# Patient Record
Sex: FEMALE | Race: OTHER | NOT HISPANIC OR LATINO | URBAN - METROPOLITAN AREA
[De-identification: names, ages, dates, MRNs, and addresses within clinical notes are randomized per-mention and may not be internally consistent; named-entity substitution may affect disease eponyms.]

---
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
## 2019-09-16 ENCOUNTER — TRANSCRIPTION ENCOUNTER (OUTPATIENT)
Age: 19
End: 2019-09-16

## 2021-03-29 NOTE — ED PROVIDER NOTE - PROGRESS NOTE ADDITIONAL2
AFTER YOUR PROCEDURE    Date of Procedure: 3/29/2021    You had the following procedure(s) performed today:  Colonoscopy    Exam Results:  The Physician removed 1 polyp(s), which will be sent to the lab for testing.  and Lab results will be called or mailed to you within 7-10 business days. Please follow up as directed in your final lab report     Diet Instructions:  You may resume your regular diet today. It is recommended to avoid heavy, greasy, and spicy foods today.    Medications:  You may resume your medications as prescribed.    Activity for Today:    · DO NOT DRIVE.  Do not operate any other heavy machinery or equipment.  · Avoid activities that require coordination (climbing ladders, using a knife/cooking equipment, etc.)  · Do not make important financial or legal decisions  · Do not return to work.  · Do not drink any alcohol.  · You may resume your activity tomorrow.    It is normal to have.....    Colonoscopy / Sigmoidoscopy   · Mild abdominal distention and/or cramping are normal after these procedures, but should pass within an hour or two with the passage of air.  · Mild nausea and/or vomiting       When to call Dr. Vaughn at [phone]:    For Colonoscopy / Sigmoidoscopy   · If you have unusual belly pain that is NOT relieved with passing air  · If you have rectal bleeding more than blood streaking on the toilet tissue  · If you have moderate nausea and/or vomiting         Go to the Emergency Room if you experience any of the following:  · Severe pain  · Difficulty breathing or swallowing  · Persistent vomiting  · Vomiting blood, either brown (coffee ground in consistency) or red  · Significant rectal bleeding  · Chills or fever over 101 degrees F.     Additional Instructions:  Any further questions after hours please contact [facility] 24 hours nurse call center at [phone].   Additional Progress Note...

## 2021-09-05 ENCOUNTER — EMERGENCY (EMERGENCY)
Facility: HOSPITAL | Age: 21
LOS: 1 days | Discharge: HOME | End: 2021-09-05
Attending: EMERGENCY MEDICINE | Admitting: EMERGENCY MEDICINE
Payer: MEDICAID

## 2021-09-05 VITALS
DIASTOLIC BLOOD PRESSURE: 70 MMHG | RESPIRATION RATE: 15 BRPM | TEMPERATURE: 98 F | SYSTOLIC BLOOD PRESSURE: 104 MMHG | HEART RATE: 83 BPM | OXYGEN SATURATION: 99 %

## 2021-09-05 VITALS
HEIGHT: 61 IN | SYSTOLIC BLOOD PRESSURE: 108 MMHG | WEIGHT: 115.08 LBS | OXYGEN SATURATION: 99 % | HEART RATE: 89 BPM | DIASTOLIC BLOOD PRESSURE: 68 MMHG | TEMPERATURE: 99 F | RESPIRATION RATE: 15 BRPM

## 2021-09-05 PROCEDURE — 29515 APPLICATION SHORT LEG SPLINT: CPT

## 2021-09-05 PROCEDURE — 29515 APPLICATION SHORT LEG SPLINT: CPT | Mod: LT

## 2021-09-05 PROCEDURE — 73630 X-RAY EXAM OF FOOT: CPT

## 2021-09-05 PROCEDURE — 99283 EMERGENCY DEPT VISIT LOW MDM: CPT | Mod: 25

## 2021-09-05 PROCEDURE — 73630 X-RAY EXAM OF FOOT: CPT | Mod: 26,LT

## 2021-09-05 RX ORDER — ACETAMINOPHEN 500 MG
650 TABLET ORAL ONCE
Refills: 0 | Status: COMPLETED | OUTPATIENT
Start: 2021-09-05 | End: 2021-09-05

## 2021-09-05 RX ORDER — NORETHINDRONE AND ETHINYL ESTRADIOL 0.4-0.035
1 KIT ORAL
Qty: 0 | Refills: 0 | DISCHARGE

## 2021-09-05 RX ADMIN — Medication 650 MILLIGRAM(S): at 17:10

## 2021-09-05 RX ADMIN — Medication 650 MILLIGRAM(S): at 17:50

## 2021-09-05 NOTE — ED ADULT NURSE NOTE - OBJECTIVE STATEMENT
Pt c/o pin and bruising to left foot s/p twisting ankle yesterday. Denies any other complaints. + bruising and tenderness noted to left lateral foot. CR < 2 sec.

## 2021-09-05 NOTE — ED PROVIDER NOTE - CARE PROVIDER_API CALL
Ulisses Muller (DPM)  [place] [address]  Phone: [phone]  Fax: [phone]  Follow Up Time: 1-3 Days

## 2021-09-05 NOTE — ED PROVIDER NOTE - PHYSICAL EXAMINATION
Constitutional: Awake, Alert, non-toxic. NAD. Well appearing, well nourished.   HEAD: Normocephalic, atraumatic.   EYES: EOM intact, conjunctiva and sclera are clear bilaterally.   ENT: No rhinorrhea, patent, mucous membranes pink/moist, no drooling or stridor.   NECK: Supple, non-tender  RESPIRATORY: Normal respiratory effort  EXTREMITIES: Full passive and active ROM in all extremities; (+) left lateral foot ecchymosis, swelling, and TTP, ankle and proximal tib/fib non-tender to palpation; distal pulses palpable and symmetric  SKIN: Warm, dry; good skin turgor, no apparent lesions or rashes, no ecchymosis, brisk capillary refill.  NEURO: A&O x3. Sensory and motor functions are grossly intact. Speech is normal. Appearance and judgement seem appropriate for gender and age.

## 2021-09-05 NOTE — ED ADULT NURSE NOTE - AS SC BRADEN SENSORY
Has had injections in the past but doing well currently       Of note improved after starting trintellix       (4) no impairment

## 2021-09-05 NOTE — ED PROVIDER NOTE - PROGRESS NOTE DETAILS
Sondra DECKER for attending Dr. Abbott: 23 y/o M with no significant PMHx presents to ED c/o L foot pain & ecchymosis s/p injuring foot yesterday when going down one step into a sunken living room and twisting it. Denies weakness, numbness or any other injury.     Physical Exam: WD, WN, in NAD, LLE: hip, knee & ankle nontender & FROM, foot: tender over 5th metatarsal w/ overlying ecchymosis & superficial abrasion. Sondra DECKER for attending Dr. Abbott: 23 y/o female presents to ED c/o L foot pain & ecchymosis s/p injuring foot yesterday when going down one step into a sunken living room and twisting it. Denies weakness, numbness or any other injury.     Physical Exam: WD, WN, in NAD, LLE: hip, knee & ankle nontender & FROM, foot: tender over 5th metatarsal w/ overlying ecchymosis & superficial abrasion, distal n/v intact, cap refill < 2 secs all toes splint applied. All questions were answered. Discussed the importance of prompt, close medical follow-up. Patient will return with any changes, concerns or persistent/worsening symptoms.  Patient verbalized understanding.

## 2021-09-05 NOTE — ED PROVIDER NOTE - PATIENT PORTAL LINK FT
You can access the FollowMyHealth Patient Portal offered by [facility] by registering at the following website: [email or web address]. By joining [employer]’s FollowMyHealth portal, you will also be able to view your health information using other applications (apps) compatible with our system.

## 2021-09-05 NOTE — ED PROVIDER NOTE - CLINICAL SUMMARY MEDICAL DECISION MAKING FREE TEXT BOX
presents today due to left foot pain x 1 day. pt reports she twisted her foot yesterday on marble floor. plan includes xray foot r/o fx, and pain control

## 2021-09-05 NOTE — ED PROVIDER NOTE - NSFOLLOWUPINSTRUCTIONS_ED_ALL_ED_FT
1) Follow-up with Podiatry, See referred doctor. Call today/next business day for close, prompt follow-up.  2) Return to Emergency room for any worsening or persistent pain, weakness, numbness, fever, color change to extremity, or any other concerning symptoms.  3) Take ibuprofen 600 mg every  6 hours as needed.   4) You can consider discussing with your doctor if physical therapy or further imaging as an MRI may be beneficial.       Foot Pain    Many things can cause foot pain. Some common causes are:  •An injury.      •A sprain.      •Arthritis.      •Blisters.      •Bunions.        Follow these instructions at home:      Managing pain, stiffness, and swelling   If directed, put ice on the painful area:  •Put ice in a plastic bag.      •Place a towel between your skin and the bag.      •Leave the ice on for 20 minutes, 2–3 times a day.      Activity     • Do not stand or walk for long periods.      •Return to your normal activities as told by your health care provider. Ask your health care provider what activities are safe for you.      •Do stretches to relieve foot pain and stiffness as told by your health care provider.      • Do not lift anything that is heavier than 10 lb (4.5 kg), or the limit that you are told, until your health care provider says that it is safe. Lifting a lot of weight can put added pressure on your feet.      Lifestyle     •Wear comfortable, supportive shoes that fit you well. Do not wear high heels.      •Keep your feet clean and dry.      General instructions     •Take over-the-counter and prescription medicines only as told by your health care provider.      •Rub your foot gently.      •Pay attention to any changes in your symptoms.      •Keep all follow-up visits as told by your health care provider. This is important.        Contact a health care provider if:    •Your pain does not get better after a few days of self-care.      •Your pain gets worse.      •You cannot stand on your foot.        Get help right away if:    •Your foot is numb or tingling.      •Your foot or toes are swollen.      •Your foot or toes turn white or blue.      •You have warmth and redness along your foot.        Summary    •Common causes of foot pain are injury, sprain, arthritis, blisters or bunions.      •Ice, medicines, and comfortable shoes may help foot pain.      •Contact your health care provider if your pain does not get better after a few days of self-care.      This information is not intended to replace advice given to you by your health care provider. Make sure you discuss any questions you have with your health care provider.

## 2021-09-05 NOTE — ED PROVIDER NOTE - OBJECTIVE STATEMENT
20 y/o female with PMHx Ovarian cyst presents today due to left foot pain x 1 day. pt reports she twisted her foot yesterday on marble floor. pt describes pain as aching, non-radiating, and currently 6/10. pt reports swelling and bruising. pt reports she sustained a abrasion pt denies fever, head injury, LOC, numbness/weakness, or any other complaints.

## 2021-09-11 ENCOUNTER — EMERGENCY (EMERGENCY)
Facility: HOSPITAL | Age: 21
LOS: 1 days | Discharge: HOME | End: 2021-09-11
Attending: EMERGENCY MEDICINE | Admitting: EMERGENCY MEDICINE
Payer: MEDICAID

## 2021-09-11 VITALS
OXYGEN SATURATION: 99 % | HEART RATE: 84 BPM | WEIGHT: 119.93 LBS | DIASTOLIC BLOOD PRESSURE: 69 MMHG | HEIGHT: 61 IN | TEMPERATURE: 98 F | RESPIRATION RATE: 20 BRPM | SYSTOLIC BLOOD PRESSURE: 104 MMHG

## 2021-09-11 PROCEDURE — 99283 EMERGENCY DEPT VISIT LOW MDM: CPT

## 2021-09-11 PROCEDURE — 99282 EMERGENCY DEPT VISIT SF MDM: CPT

## 2021-09-11 NOTE — ED PROVIDER NOTE - NSFOLLOWUPINSTRUCTIONS_ED_ALL_ED_FT
Foot Sprain    WHAT YOU NEED TO KNOW:    A foot sprain is a stretched or torn ligament in the foot or toe. Ligaments are tough tissues that connect bones.    DISCHARGE INSTRUCTIONS:    Return to the emergency department if:   •You have numbness or tingling below the injury, such as in your toes.      •The skin on your injured foot is blue or pale.      •You have increased pain, even after you take pain medicine.      Call your doctor if:   •You have new weakness in your foot.      •You have new or increased swelling in your foot.      •You have new or increased stiffness when you move your injured foot.      •You have questions or concerns about your condition or care.      Medicines:   •NSAIDs, such as ibuprofen, help decrease swelling, pain, and fever. This medicine is available with or without a doctor's order. NSAIDs can cause stomach bleeding or kidney problems in certain people. If you take blood thinner medicine, always ask if NSAIDs are safe for you. Always read the medicine label and follow directions. Do not give these medicines to children under 6 months of age without direction from your child's healthcare provider.      •Take your medicine as directed. Contact your healthcare provider if you think your medicine is not helping or if you have side effects. Tell him of her if you are allergic to any medicine. Keep a list of the medicines, vitamins, and herbs you take. Include the amounts, and when and why you take them. Bring the list or the pill bottles to follow-up visits. Carry your medicine list with you in case of an emergency.      Self-care:   •Rest your foot. Limit movement in your sprained foot for the first 2 to 3 days. You might need crutches to take weight off your injured foot as it heals. Use crutches as directed.  Walking with Crutches           •Apply ice on your foot for 15 to 20 minutes every hour or as directed. Use an ice pack, or put crushed ice in a plastic bag. Cover it with a towel. Ice helps prevent tissue damage and decreases swelling and pain.      •Compress your foot. You may need to use tape or an elastic bandage to support your foot if you have a mild sprain. You may need a splint on your foot for support if your sprain is severe. Wear your splint for as many days as directed.      •Elevate your foot above the level of your heart as often as you can. This will help decrease swelling and pain. Prop your foot on pillows or blankets to keep it elevated comfortably.   Elevate Leg           Exercise your foot: You may be given exercises to improve your strength and to help decrease stiffness. The exercises and physical therapy can help restore strength and increase the range of motion in your foot. Ask your healthcare provider when you can return to your normal activities or play sports.    Prevent another foot sprain:   •Warm up and stretch before you exercise.      •Do not exercise when you feel pain or are tired.      •Wear equipment to protect yourself when you play sports.      Follow up with your doctor as directed: Write down your questions so you remember to ask them during your visits.

## 2021-09-11 NOTE — ED ADULT NURSE NOTE - OBJECTIVE STATEMENT
Seen here one week ago for sprained ankle. splint applied to left ankle. c/o increased pain, splint unraveling.  patient ambulates to void, pulse +, MD at bedside assessing patient, will continue to monitor.

## 2021-09-11 NOTE — ED PROVIDER NOTE - CARE PROVIDER_API CALL
King Puentes  ORTHOPAEDIC SURGERY  [address] [place] [address]  Phone: [phone]  Fax: [phone]  Follow Up Time:

## 2021-09-11 NOTE — ED PROVIDER NOTE - NS_ ATTENDINGSCRIBEDETAILS _ED_A_ED_FT
Arley Lynn MD - The scribe's documentation has been prepared under my direction and personally reviewed by me in its entirety. I confirm that the note above accurately reflects all work, treatment, procedures, and medical decision making performed by me.

## 2021-09-11 NOTE — ED PROVIDER NOTE - OBJECTIVE STATEMENT
22 y/o male with no PMHx presents to the ED c/o ankle pain.  Pt was here one week ago for a sprained ankle. A splint applied to left ankle. Pt reports now the splint is unraveling. 22 y/o male with no PMHx presents to the ED c/o ankle pain.  Pt was here one week ago for a sprained ankle with swelling. A splint applied to left ankle. Pt reports that the splint was unraveling. Pt described the pain is traveling up left leg and discomfort in the left foot. Pt reports walking with the splint. Denies pain in upper left foot.

## 2021-09-11 NOTE — ED PROVIDER NOTE - CLINICAL SUMMARY MEDICAL DECISION MAKING FREE TEXT BOX
Pt one week s/p foot sprain still in splint c/o discomfort and loosening splint. Will advise removal of splint local care. Can f/u with orthopedist if not completely improved.

## 2021-09-11 NOTE — ED PROVIDER NOTE - MUSCULOSKELETAL, MLM
left foot no swelling no sign bony tenderness ecchymosis lateral aspect, FROM all joints neurovascular intact all digits

## 2021-09-11 NOTE — ED ADULT TRIAGE NOTE - CHIEF COMPLAINT QUOTE
Seen here one week ago for sprained ankle. splint applied to left ankle. c/o increased pain, splint unraveling.

## 2021-09-11 NOTE — ED PROVIDER NOTE - PATIENT PORTAL LINK FT
You can access the FollowMyHealth Patient Portal offered by [facility] by registering at the following website: [email or web address]. By joining [employer]’s FollowMyHealth portal, you will also be able to view your health information using other applications (apps) compatible with our system.

## 2022-01-14 NOTE — ED ADULT NURSE NOTE - NS_NURSE_DISC_TEACHING_YN_ED_ALL_ED
-- DO NOT REPLY / DO NOT REPLY ALL --  -- Message is from the [facility]--    General Patient Message      Reason for Call:  Calling on the status of the medication refill on 13 prescriptions   Please update     Caller Information       Type Contact Phone    01/14/2022 09:32 AM CST Phone (Incoming) [employer]  [phone]          Alternative phone number:     Turnaround time given to caller:   \"This message will be sent to [state Provider's name]. The clinical team will fulfill your request as soon as they review your message.\"     Yes

## 2022-02-14 NOTE — ED PROVIDER NOTE - MUSCULOSKELETAL [+], MLM
+left leg pain
Detail Level: Detailed
Quality 130: Documentation Of Current Medications In The Medical Record: Current Medications Documented
Quality 265: Biopsy Follow-Up: Biopsy results reviewed, communicated, tracked, and documented

## 2022-06-07 NOTE — ED PROVIDER NOTE - MUSCULOSKELETAL [+], MLM
----- Message from Willard Brian sent at 6/6/2022  3:51 PM EDT -----  Regarding: Medication change request for Alicia Smith [facility] [place] where Brenda Nixon goes to get his dialysis, is requesting that Brenda Nixon not take his Amlodipine before his treatments which are from 11:30-3PM on M, W & F  What time of the day would you like him to take this medication? He can take it anytime between 3PM and bedtime (which keeps getting earlier, with everything that he has going on) on dialysis days and at any other time on the other days of the week   I fill his pill boxes so I can put it for any time that you want it to be taken     Hope you are doing well and thanks for always being there for Brenda Gomezperri Adam and Alicia Alanis [facility]

## 2022-09-13 ENCOUNTER — TRANSCRIPTION ENCOUNTER (OUTPATIENT)
Age: 22
End: 2022-09-13

## 2022-09-14 ENCOUNTER — TRANSCRIPTION ENCOUNTER (OUTPATIENT)
Age: 22
End: 2022-09-14

## 2022-09-15 ENCOUNTER — RESULT REVIEW (OUTPATIENT)
Age: 22
End: 2022-09-15

## 2022-09-15 ENCOUNTER — INPATIENT (INPATIENT)
Facility: HOSPITAL | Age: 22
LOS: 0 days | Discharge: HOME | DRG: 743 | End: 2022-09-15
Payer: MEDICAID

## 2022-09-15 ENCOUNTER — TRANSCRIPTION ENCOUNTER (OUTPATIENT)
Age: 22
End: 2022-09-15

## 2022-09-15 VITALS
SYSTOLIC BLOOD PRESSURE: 104 MMHG | HEART RATE: 96 BPM | HEIGHT: 63 IN | TEMPERATURE: 98 F | RESPIRATION RATE: 16 BRPM | WEIGHT: 110.01 LBS | DIASTOLIC BLOOD PRESSURE: 65 MMHG

## 2022-09-15 VITALS
RESPIRATION RATE: 18 BRPM | OXYGEN SATURATION: 100 % | DIASTOLIC BLOOD PRESSURE: 74 MMHG | SYSTOLIC BLOOD PRESSURE: 106 MMHG | HEART RATE: 86 BPM

## 2022-09-15 DIAGNOSIS — N83.519 TORSION OF OVARY AND OVARIAN PEDICLE, UNSPECIFIED SIDE: ICD-10-CM

## 2022-09-15 DIAGNOSIS — R19.00 INTRA-ABDOMINAL AND PELVIC SWELLING, MASS AND LUMP, UNSPECIFIED SITE: ICD-10-CM

## 2022-09-15 LAB
ABO RH CONFIRMATION: SIGNIFICANT CHANGE UP
ALBUMIN SERPL ELPH-MCNC: 4.4 G/DL — SIGNIFICANT CHANGE UP (ref 3.3–5)
ALP SERPL-CCNC: 51 U/L — SIGNIFICANT CHANGE UP (ref 30–120)
ALT FLD-CCNC: 14 U/L DA — SIGNIFICANT CHANGE UP (ref 10–60)
ANION GAP SERPL CALC-SCNC: 9 MMOL/L — SIGNIFICANT CHANGE UP (ref 5–17)
APPEARANCE UR: ABNORMAL
APTT BLD: 33.1 SEC — SIGNIFICANT CHANGE UP (ref 27.5–35.5)
AST SERPL-CCNC: 13 U/L — SIGNIFICANT CHANGE UP (ref 10–40)
BACTERIA # UR AUTO: ABNORMAL
BASOPHILS # BLD AUTO: 0.03 K/UL — SIGNIFICANT CHANGE UP (ref 0–0.2)
BASOPHILS NFR BLD AUTO: 0.3 % — SIGNIFICANT CHANGE UP (ref 0–2)
BILIRUB SERPL-MCNC: 0.4 MG/DL — SIGNIFICANT CHANGE UP (ref 0.2–1.2)
BILIRUB UR-MCNC: NEGATIVE — SIGNIFICANT CHANGE UP
BLD GP AB SCN SERPL QL: SIGNIFICANT CHANGE UP
BUN SERPL-MCNC: 6 MG/DL — LOW (ref 7–23)
CALCIUM SERPL-MCNC: 9.3 MG/DL — SIGNIFICANT CHANGE UP (ref 8.4–10.5)
CHLORIDE SERPL-SCNC: 100 MMOL/L — SIGNIFICANT CHANGE UP (ref 96–108)
CO2 SERPL-SCNC: 29 MMOL/L — SIGNIFICANT CHANGE UP (ref 22–31)
COLOR SPEC: ABNORMAL
CREAT SERPL-MCNC: 0.73 MG/DL — SIGNIFICANT CHANGE UP (ref 0.5–1.3)
DIFF PNL FLD: ABNORMAL
EGFR: 118 ML/MIN/1.73M2 — SIGNIFICANT CHANGE UP
EOSINOPHIL # BLD AUTO: 0.04 K/UL — SIGNIFICANT CHANGE UP (ref 0–0.5)
EOSINOPHIL NFR BLD AUTO: 0.4 % — SIGNIFICANT CHANGE UP (ref 0–6)
EPI CELLS # UR: SIGNIFICANT CHANGE UP
GLUCOSE SERPL-MCNC: 141 MG/DL — HIGH (ref 70–99)
GLUCOSE UR QL: NEGATIVE MG/DL — SIGNIFICANT CHANGE UP
HCG UR QL: NEGATIVE — SIGNIFICANT CHANGE UP
HCT VFR BLD CALC: 37.8 % — SIGNIFICANT CHANGE UP (ref 34.5–45)
HGB BLD-MCNC: 12.5 G/DL — SIGNIFICANT CHANGE UP (ref 11.5–15.5)
IMM GRANULOCYTES NFR BLD AUTO: 0.3 % — SIGNIFICANT CHANGE UP (ref 0–0.9)
INR BLD: 1.27 RATIO — HIGH (ref 0.88–1.16)
KETONES UR-MCNC: ABNORMAL
LEUKOCYTE ESTERASE UR-ACNC: ABNORMAL
LIDOCAIN IGE QN: 83 U/L — SIGNIFICANT CHANGE UP (ref 73–393)
LYMPHOCYTES # BLD AUTO: 1.07 K/UL — SIGNIFICANT CHANGE UP (ref 1–3.3)
LYMPHOCYTES # BLD AUTO: 11.1 % — LOW (ref 13–44)
MCHC RBC-ENTMCNC: 29.6 PG — SIGNIFICANT CHANGE UP (ref 27–34)
MCHC RBC-ENTMCNC: 33.1 GM/DL — SIGNIFICANT CHANGE UP (ref 32–36)
MCV RBC AUTO: 89.4 FL — SIGNIFICANT CHANGE UP (ref 80–100)
MONOCYTES # BLD AUTO: 0.38 K/UL — SIGNIFICANT CHANGE UP (ref 0–0.9)
MONOCYTES NFR BLD AUTO: 4 % — SIGNIFICANT CHANGE UP (ref 2–14)
NEUTROPHILS # BLD AUTO: 8.05 K/UL — HIGH (ref 1.8–7.4)
NEUTROPHILS NFR BLD AUTO: 83.9 % — HIGH (ref 43–77)
NITRITE UR-MCNC: NEGATIVE — SIGNIFICANT CHANGE UP
NRBC # BLD: 0 /100 WBCS — SIGNIFICANT CHANGE UP (ref 0–0)
PH UR: 7 — SIGNIFICANT CHANGE UP (ref 5–8)
PLATELET # BLD AUTO: 239 K/UL — SIGNIFICANT CHANGE UP (ref 150–400)
POTASSIUM SERPL-MCNC: 3.3 MMOL/L — LOW (ref 3.5–5.3)
POTASSIUM SERPL-SCNC: 3.3 MMOL/L — LOW (ref 3.5–5.3)
PROT SERPL-MCNC: 8 G/DL — SIGNIFICANT CHANGE UP (ref 6–8.3)
PROT UR-MCNC: 500 MG/DL
PROTHROM AB SERPL-ACNC: 14.6 SEC — HIGH (ref 10.5–13.4)
RBC # BLD: 4.23 M/UL — SIGNIFICANT CHANGE UP (ref 3.8–5.2)
RBC # FLD: 12.6 % — SIGNIFICANT CHANGE UP (ref 10.3–14.5)
RBC CASTS # UR COMP ASSIST: >50 /HPF (ref 0–4)
SARS-COV-2 RNA SPEC QL NAA+PROBE: SIGNIFICANT CHANGE UP
SODIUM SERPL-SCNC: 138 MMOL/L — SIGNIFICANT CHANGE UP (ref 135–145)
SP GR SPEC: 1.01 — SIGNIFICANT CHANGE UP (ref 1.01–1.02)
UROBILINOGEN FLD QL: NEGATIVE MG/DL — SIGNIFICANT CHANGE UP
WBC # BLD: 9.6 K/UL — SIGNIFICANT CHANGE UP (ref 3.8–10.5)
WBC # FLD AUTO: 9.6 K/UL — SIGNIFICANT CHANGE UP (ref 3.8–10.5)
WBC UR QL: SIGNIFICANT CHANGE UP

## 2022-09-15 PROCEDURE — 87635 SARS-COV-2 COVID-19 AMP PRB: CPT

## 2022-09-15 PROCEDURE — 86900 BLOOD TYPING SEROLOGIC ABO: CPT

## 2022-09-15 PROCEDURE — 86850 RBC ANTIBODY SCREEN: CPT

## 2022-09-15 PROCEDURE — 80053 COMPREHEN METABOLIC PANEL: CPT

## 2022-09-15 PROCEDURE — 76856 US EXAM PELVIC COMPLETE: CPT | Mod: 26

## 2022-09-15 PROCEDURE — 76830 TRANSVAGINAL US NON-OB: CPT

## 2022-09-15 PROCEDURE — 96361 HYDRATE IV INFUSION ADD-ON: CPT

## 2022-09-15 PROCEDURE — 96374 THER/PROPH/DIAG INJ IV PUSH: CPT

## 2022-09-15 PROCEDURE — 88305 TISSUE EXAM BY PATHOLOGIST: CPT

## 2022-09-15 PROCEDURE — 87086 URINE CULTURE/COLONY COUNT: CPT

## 2022-09-15 PROCEDURE — 76830 TRANSVAGINAL US NON-OB: CPT | Mod: 26

## 2022-09-15 PROCEDURE — 85730 THROMBOPLASTIN TIME PARTIAL: CPT

## 2022-09-15 PROCEDURE — 99285 EMERGENCY DEPT VISIT HI MDM: CPT

## 2022-09-15 PROCEDURE — 85025 COMPLETE CBC W/AUTO DIFF WBC: CPT

## 2022-09-15 PROCEDURE — 85610 PROTHROMBIN TIME: CPT

## 2022-09-15 PROCEDURE — 86901 BLOOD TYPING SEROLOGIC RH(D): CPT

## 2022-09-15 PROCEDURE — 81001 URINALYSIS AUTO W/SCOPE: CPT

## 2022-09-15 PROCEDURE — 83690 ASSAY OF LIPASE: CPT

## 2022-09-15 PROCEDURE — 88305 TISSUE EXAM BY PATHOLOGIST: CPT | Mod: 26

## 2022-09-15 PROCEDURE — 76856 US EXAM PELVIC COMPLETE: CPT

## 2022-09-15 PROCEDURE — 36415 COLL VENOUS BLD VENIPUNCTURE: CPT

## 2022-09-15 PROCEDURE — 81025 URINE PREGNANCY TEST: CPT

## 2022-09-15 DEVICE — MESH HERNIA PROCEED CIR MED 6.4CM VENTRAL: Type: IMPLANTABLE DEVICE | Site: LEFT | Status: FUNCTIONAL

## 2022-09-15 DEVICE — CLIP APPLIER COVIDIEN ENDOCLIP II 10MM MED/LG: Type: IMPLANTABLE DEVICE | Site: LEFT | Status: FUNCTIONAL

## 2022-09-15 DEVICE — DEVICE ABSORBATACK 5MM 30 TACK FIXATION: Type: IMPLANTABLE DEVICE | Site: LEFT | Status: FUNCTIONAL

## 2022-09-15 RX ORDER — ONDANSETRON 8 MG/1
4 TABLET, FILM COATED ORAL ONCE
Refills: 0 | Status: COMPLETED | OUTPATIENT
Start: 2022-09-15 | End: 2022-09-15

## 2022-09-15 RX ORDER — IBUPROFEN 200 MG
3 TABLET ORAL
Qty: 0 | Refills: 0 | DISCHARGE

## 2022-09-15 RX ORDER — KETOROLAC TROMETHAMINE 30 MG/ML
30 SYRINGE (ML) INJECTION ONCE
Refills: 0 | Status: DISCONTINUED | OUTPATIENT
Start: 2022-09-15 | End: 2022-09-15

## 2022-09-15 RX ORDER — SODIUM CHLORIDE 9 MG/ML
1000 INJECTION INTRAMUSCULAR; INTRAVENOUS; SUBCUTANEOUS ONCE
Refills: 0 | Status: COMPLETED | OUTPATIENT
Start: 2022-09-15 | End: 2022-09-15

## 2022-09-15 RX ORDER — SODIUM CHLORIDE 9 MG/ML
1000 INJECTION, SOLUTION INTRAVENOUS
Refills: 0 | Status: DISCONTINUED | OUTPATIENT
Start: 2022-09-15 | End: 2022-09-15

## 2022-09-15 RX ORDER — OXYCODONE HYDROCHLORIDE 5 MG/1
5 TABLET ORAL ONCE
Refills: 0 | Status: DISCONTINUED | OUTPATIENT
Start: 2022-09-15 | End: 2022-09-15

## 2022-09-15 RX ORDER — ACETAMINOPHEN 500 MG
2 TABLET ORAL
Qty: 0 | Refills: 0 | DISCHARGE

## 2022-09-15 RX ORDER — ONDANSETRON 8 MG/1
4 TABLET, FILM COATED ORAL ONCE
Refills: 0 | Status: DISCONTINUED | OUTPATIENT
Start: 2022-09-15 | End: 2022-09-15

## 2022-09-15 RX ORDER — HYDROMORPHONE HYDROCHLORIDE 2 MG/ML
0.5 INJECTION INTRAMUSCULAR; INTRAVENOUS; SUBCUTANEOUS
Refills: 0 | Status: DISCONTINUED | OUTPATIENT
Start: 2022-09-15 | End: 2022-09-15

## 2022-09-15 RX ORDER — HYDROMORPHONE HYDROCHLORIDE 2 MG/ML
1 INJECTION INTRAMUSCULAR; INTRAVENOUS; SUBCUTANEOUS
Refills: 0 | Status: DISCONTINUED | OUTPATIENT
Start: 2022-09-15 | End: 2022-09-15

## 2022-09-15 RX ORDER — OXYCODONE HYDROCHLORIDE 5 MG/1
1 TABLET ORAL
Qty: 12 | Refills: 0
Start: 2022-09-15 | End: 2022-09-17

## 2022-09-15 RX ORDER — ACETAMINOPHEN 500 MG
0 TABLET ORAL
Qty: 0 | Refills: 0 | DISCHARGE

## 2022-09-15 RX ADMIN — SODIUM CHLORIDE 1000 MILLILITER(S): 9 INJECTION INTRAMUSCULAR; INTRAVENOUS; SUBCUTANEOUS at 14:03

## 2022-09-15 RX ADMIN — Medication 30 MILLIGRAM(S): at 14:04

## 2022-09-15 RX ADMIN — HYDROMORPHONE HYDROCHLORIDE 0.5 MILLIGRAM(S): 2 INJECTION INTRAMUSCULAR; INTRAVENOUS; SUBCUTANEOUS at 20:45

## 2022-09-15 RX ADMIN — HYDROMORPHONE HYDROCHLORIDE 0.5 MILLIGRAM(S): 2 INJECTION INTRAMUSCULAR; INTRAVENOUS; SUBCUTANEOUS at 20:28

## 2022-09-15 RX ADMIN — SODIUM CHLORIDE 75 MILLILITER(S): 9 INJECTION, SOLUTION INTRAVENOUS at 20:45

## 2022-09-15 NOTE — ED PROVIDER NOTE - OBJECTIVE STATEMENT
Patient complaining of cramping pelvic pain since this morning, which is similar to her prior menstrual cramps but more severe.  Patient relates her menstrual period did begin today.  Patient relates that approximately 1130 this morning she then became nauseous and vomited a few times.  Patient relates she had small amounts of loose stool when vomiting.  No fevers chills dysuria hematuria frequency.  No prior abdominal surgeries.  Patient declining CT at this time.    PMD Dr. Mohseni

## 2022-09-15 NOTE — ASU DISCHARGE PLAN (ADULT/PEDIATRIC) - CARE PROVIDER_API CALL
Hilda Escobar)  Obstetrics and Gynecology  [address] [place] [address]  Phone: [phone]  Fax: [phone]  Established Patient  Follow Up Time: 1 week

## 2022-09-15 NOTE — BRIEF OPERATIVE NOTE - NSICDXBRIEFPOSTOP_GEN_ALL_CORE_FT
POST-OP DIAGNOSIS:  Moderate endometriosis 15-Sep-2022 20:17:29  Hilda Escobar  Endometrioma 15-Sep-2022 20:18:42 left Hilda Escobar  Pelvic adhesions 15-Sep-2022 20:19:24  Hilda Escobar

## 2022-09-15 NOTE — H&P PST ADULT - PROBLEM SELECTOR PLAN 1
discussed the findings on sono. In view of her presentation and the sonographic finding; Laparoscopy and evaluation of left cystic mass is proposed. Possible cystectomy, possible oohorectomy was also discussed.

## 2022-09-15 NOTE — ED PROVIDER NOTE - CLINICAL SUMMARY MEDICAL DECISION MAKING FREE TEXT BOX
Patient complaining of cramping pelvic pain since this morning, which is similar to her prior menstrual cramps but more severe.  Patient relates her menstrual period did begin today.  Patient relates that approximately 1130 this morning she then became nauseous and vomited a few times.  Patient relates she had small amounts of loose stool when vomiting.  No fevers chills dysuria hematuria frequency.  No prior abdominal surgeries.  Patient declining CT at this time.  Plan labs ultrasound IV fluid Zofran

## 2022-09-15 NOTE — ED ADULT NURSE NOTE - NSIMPLEMENTINTERV_GEN_ALL_ED
Implemented All Universal Safety Interventions:  [place] to call system. Call bell, personal items and telephone within reach. Instruct patient to call for assistance. Room bathroom lighting operational. Non-slip footwear when patient is off stretcher. Physically safe environment: no spills, clutter or unnecessary equipment. Stretcher in lowest position, wheels locked, appropriate side rails in place.

## 2022-09-15 NOTE — BRIEF OPERATIVE NOTE - NSICDXBRIEFPROCEDURE_GEN_ALL_CORE_FT
PROCEDURES:  Diagnostic laparoscopy 15-Sep-2022 20:14:46  Hilda Escobar  Ovarian biopsy 15-Sep-2022 20:15:19  Hilda Escobar

## 2022-09-15 NOTE — PRE-OP CHECKLIST - SELECT TESTS ORDERED
PT/PTT/Type and Screen transvaginal and pelvic sono/CBC/CMP/PT/PTT/INR/Type and Screen/Urinalysis/UCG/COVID-19

## 2022-09-15 NOTE — H&P PST ADULT - HISTORY OF PRESENT ILLNESS
Patient is a 23 y/o  female complaining of cramping pelvic pain since this morning. This is similar to her  menstrual cramps but more severe.  Patient's menstrual period did began today.  The pain began at  approximately 1130 this morning, she then became nauseous and vomited a few times.  Patient relates she had small amounts of loose stool when vomiting.  No fevers chills dysuria hematuria frequency.  No prior abdominal surgeries.  Patient declining CT at this time. Pelvic sono was performed, This revealed: < from: US Pelvis Complete (US Pelvis Complete .) (09.15.22 @ 16:07) >  Uterus: 8.3 x 2.7 x 5.9 cm. The uterus is anteverted. Within normal   limits.  Endometrium: 5 mm. Within normal limits.    Right ovary: 3.6 x 2.7 x 3.9 cm. . Within normal limits. Normal arterial   and venous waveforms.  Left ovary: A normal left ovary cannot be identified. There is a large   complex hypodense structure, measuring 5.6 x 6.4 x 5.2 cm. Vascularity is   demonstrated peripherally, though not within the lesion itself. As per   the ultrasound technologist, the ovary is also noted to be along the left   para midline pelvis.  Fluid: Small amount of free fluid isseen within the pelvis..      < end of copied text >

## 2022-09-15 NOTE — ASU DISCHARGE PLAN (ADULT/PEDIATRIC) - NS MD DC FALL RISK RISK
For information on Fall & Injury Prevention, visit: [email or web address] OR  [email or web address] OR  https://www.cdc.gov/steadi/patient.html

## 2022-09-15 NOTE — ED ADULT NURSE REASSESSMENT NOTE - NS ED NURSE REASSESS COMMENT FT1
belongings to mother and phone pt sent up with underwear on menses and staff aware nose ring cut off could not remove with pt permission

## 2022-09-15 NOTE — ED ADULT NURSE NOTE - OBJECTIVE STATEMENT
Pt c/o vomiting with lower abd and back pain with some diarrhea, started today. Denies fever, dysuria. Safety maintained, call bell within reach. Nursing care ongoing.

## 2022-09-15 NOTE — BRIEF OPERATIVE NOTE - NSICDXBRIEFPREOP_GEN_ALL_CORE_FT
PRE-OP DIAGNOSIS:  Ovarian cyst, left 15-Sep-2022 20:16:09  Hilda Escobar  Torsion of left ovary 15-Sep-2022 20:17:04  Hilda Escobar

## 2022-09-15 NOTE — ED ADULT NURSE REASSESSMENT NOTE - NS ED NURSE REASSESS COMMENT FT1
jewelry removed except nose ring awaiting mom needs special tool and bracelet needs special screwdriver awaiting engineering blood done and covid done

## 2022-09-15 NOTE — H&P PST ADULT - ASSESSMENT
young women with onset of acute of pelvic pain today. This was associated with nausea, vomiting and diarrhea.  Sono notes a large complex mass on the left. Questionable torsion??

## 2022-09-15 NOTE — ASU DISCHARGE PLAN (ADULT/PEDIATRIC) - ACTIVITY LEVEL
No heavy lifting/No sports/gym/No weight bearing/Nothing per vagina/No tub baths/No douching/No tampons/No intercourse

## 2022-09-24 ENCOUNTER — EMERGENCY (EMERGENCY)
Facility: HOSPITAL | Age: 22
LOS: 1 days | Discharge: HOME | End: 2022-09-24
Attending: EMERGENCY MEDICINE | Admitting: EMERGENCY MEDICINE
Payer: MEDICAID

## 2022-09-24 VITALS
OXYGEN SATURATION: 97 % | HEIGHT: 63 IN | HEART RATE: 76 BPM | TEMPERATURE: 98 F | RESPIRATION RATE: 15 BRPM | WEIGHT: 105.82 LBS | DIASTOLIC BLOOD PRESSURE: 80 MMHG | SYSTOLIC BLOOD PRESSURE: 114 MMHG

## 2022-09-24 PROCEDURE — 99283 EMERGENCY DEPT VISIT LOW MDM: CPT

## 2022-09-24 PROCEDURE — G0463: CPT

## 2022-09-24 NOTE — ED PROVIDER NOTE - OBJECTIVE STATEMENT
21 y/o female with a PMHx of ovarian torsion presents to the ED requesting for a wound check s/p surgery 9/15. Pt states she has been waiting for a follow up, but no one has contacted her. She was told by the nurse that her wound should be checked a week ago. Reports irritation and itchiness.

## 2022-09-24 NOTE — ED ADULT NURSE NOTE - OBJECTIVE STATEMENT
21 y/o female presents to the ED requesting for a wound check s/p surgery 9/15. Pt states she has been waiting for a follow up, but no one has contacted her. She was told by the nurse that her wound should be checked a week ago. Reports irritation and itchiness.

## 2022-09-24 NOTE — ED PROVIDER NOTE - CARE PROVIDER_API CALL
Hilda Escobar)  Obstetrics and Gynecology  [address] [place] [address]  Phone: [phone]  Fax: [phone]  Follow Up Time: 1-3 Days

## 2022-09-24 NOTE — ED PROVIDER NOTE - NS ED ATTENDING STATEMENT MOD
This was a shared visit with the REY. I reviewed and verified the documentation and independently performed the documented:

## 2022-09-24 NOTE — ED PROVIDER NOTE - PATIENT PORTAL LINK FT
You can access the FollowMyHealth Patient Portal offered by [facility] by registering at the following website: [email or web address]. By joining [employer]’s FollowMyHealth portal, you will also be able to view your health information using other applications (apps) compatible with our system.

## 2022-09-24 NOTE — ED PROVIDER NOTE - NS_ ATTENDINGSCRIBEDETAILS _ED_A_ED_FT
Yordan Dang MD - The scribe's documentation has been prepared under my direction and personally reviewed by me in its entirety. I confirm that the note above accurately reflects all work, treatment, procedures, and medical decision making performed by me.

## 2023-04-20 NOTE — H&P PST ADULT - DOES PATIENT MEET CRITERIA FOR SEPSIS
H&P reviewed. The patient was examined and there are no changes to the H&P.      We did discuss colonoscopy exam today.  Risk and limitations as well as benefits.  She expressed understanding she wishes to proceed.  I also discussed with her that if she continues have intermittent right lower quadrant discomfort never had a severe attack with colonoscopy not finding think she may want to consider appendectomy.  If she has a significant mount discomfort within attack she will need to go to nearest emergency room.  She expressed understanding and states she was already aware   No

## 2023-07-07 NOTE — H&P PST ADULT - FUNCTIONAL ASSESSMENT - BASIC MOBILITY PT AGE POP HIDDEN
Adult [de-identified] : \par After discussing various treatment options with the patient including but not limited to oral medications, physical therapy, exercise, modalities as well as interventional spinal injections, we have decided with the following plan: \par \par Cervical Radiculopathy: \par - c/w gabapentin 300mg TID, c/w flexeril 5-10mg tid prn\par - MRI C-spine 5/31/23 reviewed \par - can repeat C7/T1 NAZARIO if pain returns.   \par - c/w PT  \par \par Myofascial Pain: \par - c/w pharmacological agents as above\par - can repeat TPI to cervical paraspinal, R trapezius performed if pain worsens \par - c/w HEP/PT\par \par - follow up as needed\par \par Octavio Hubbard MD\par

## 2023-12-15 NOTE — ED ADULT TRIAGE NOTE - DOMESTIC TRAVEL HIGH RISK QUESTION
[[email or web address]] : Dhara Webber is a 54 YOF  is coming to the clinic for acute visit with c/o fever chills, sore throat and b/l ear pain  Pt was diagnosed with Covid in 11/2023 right after thanksgiving, took Paxlovid as instructed, symptoms improved for 1 weeks and then during 1st week of December she started having fever chills and sore throat, fatigue but denies cough N/V diarrhea., hearing, ear discharge.   Pt has H/o hypogammaglobinemia, recurrent PNA on IVIG infusions, ANIA , per  Follows pulmonologist Dr. Mikaela paige [facility] in [place], pt had a recent CT chest done 3 days ago at [facility].  also has oral thrush on exam.      No

## (undated) DEVICE — WRAP COMPRESSION CALF MED

## (undated) DEVICE — PACK GENERAL LAPAROSCOPY

## (undated) DEVICE — SINGLE BASIN STERILE

## (undated) DEVICE — ENDO SHEARS COVIDIEN 5MM W UNIPOLAR CAUTERY

## (undated) DEVICE — SUT DERMABOND 0.7ML

## (undated) DEVICE — DRAPE 3/4 SHEET 52X76"

## (undated) DEVICE — ELCTR GROUNDING PAD ADULT COVIDIEN

## (undated) DEVICE — TUBING INSUFFLATION LAP FILTER 10FT

## (undated) DEVICE — CANISTER DISPOSABLE THIN WALL 3000CC

## (undated) DEVICE — SUT MONOCRYL 4-0 27" PS-2 UNDYED

## (undated) DEVICE — SUT VICRYL 0 27" UR-6

## (undated) DEVICE — SOL IRR POUR H2O 500ML

## (undated) DEVICE — GLV 7 PROTEXIS

## (undated) DEVICE — SOL IRR POUR NS 0.9% 1000ML

## (undated) DEVICE — TUBING OLYMPUS INSUFFLATION

## (undated) DEVICE — PROTECTOR HEEL / ELBOW FLUFFY

## (undated) DEVICE — SHEARS HARMONIC ACE 5MM X 36CM CURVED TIP

## (undated) DEVICE — DRAPE TOWEL BLUE 17" X 24"

## (undated) DEVICE — SUT SILK 2-0 30" SH

## (undated) DEVICE — ELCTR EDGE BOVIE INSULATED BLADE TIP 2.75"

## (undated) DEVICE — LIGASURE MARYLAND JAW LAPAROSCOPIC SEALER 37CM

## (undated) DEVICE — SUT SILK 0 24" SH DA

## (undated) DEVICE — POSITIONER STRAP ARMBOARD VELCRO TS-30 12

## (undated) DEVICE — BLADE SURGICAL #15 CARBON

## (undated) DEVICE — D HELP - CLEARVIEW CLEARIFY SYSTEM

## (undated) DEVICE — TROCAR COVIDIEN BLUNT TIP HASSAN 10MM